# Patient Record
Sex: FEMALE | Race: WHITE | NOT HISPANIC OR LATINO | Employment: FULL TIME | URBAN - METROPOLITAN AREA
[De-identification: names, ages, dates, MRNs, and addresses within clinical notes are randomized per-mention and may not be internally consistent; named-entity substitution may affect disease eponyms.]

---

## 2023-03-03 PROBLEM — R94.31 ABNORMAL ECG: Status: ACTIVE | Noted: 2023-03-03

## 2023-03-03 PROBLEM — K13.0 LIP PAIN: Status: RESOLVED | Noted: 2023-03-03 | Resolved: 2023-03-03

## 2023-03-03 PROBLEM — R79.82 CRP ELEVATED: Status: ACTIVE | Noted: 2023-03-03

## 2023-03-03 PROBLEM — I20.1 CORONARY ARTERY SPASM (CMS-HCC): Status: ACTIVE | Noted: 2023-03-03

## 2023-03-03 PROBLEM — K21.9 GERD (GASTROESOPHAGEAL REFLUX DISEASE): Status: ACTIVE | Noted: 2023-03-03

## 2023-03-03 PROBLEM — M76.32 ILIOTIBIAL BAND SYNDROME OF LEFT SIDE: Status: ACTIVE | Noted: 2023-03-03

## 2023-03-03 PROBLEM — R92.30 DENSE BREASTS: Status: ACTIVE | Noted: 2023-03-03

## 2023-03-03 PROBLEM — N64.89 RADIAL SCAR OF BREAST: Status: ACTIVE | Noted: 2023-03-03

## 2023-03-03 PROBLEM — M79.10 MYALGIA: Status: ACTIVE | Noted: 2023-03-03

## 2023-03-03 PROBLEM — N95.9 MENOPAUSAL AND POSTMENOPAUSAL DISORDER: Status: ACTIVE | Noted: 2023-03-03

## 2023-03-03 PROBLEM — R92.8 ABNORMAL MAMMOGRAM: Status: ACTIVE | Noted: 2023-03-03

## 2023-03-03 PROBLEM — D49.0 RECTAL TUMOR: Status: ACTIVE | Noted: 2023-03-03

## 2023-03-03 PROBLEM — H10.13 ALLERGIC CONJUNCTIVITIS OF BOTH EYES: Status: ACTIVE | Noted: 2023-03-03

## 2023-03-03 PROBLEM — E78.5 HYPERLIPIDEMIA: Status: ACTIVE | Noted: 2023-03-03

## 2023-03-03 PROBLEM — S30.0XXA CONTUSION OF BUTTOCK: Status: RESOLVED | Noted: 2023-03-03 | Resolved: 2023-03-03

## 2023-03-03 PROBLEM — R92.2 DENSE BREASTS: Status: ACTIVE | Noted: 2023-03-03

## 2023-03-03 RX ORDER — MULTIVIT WITH IRON,MINERALS
TABLET,CHEWABLE ORAL
COMMUNITY

## 2023-03-03 RX ORDER — IBUPROFEN 100 MG/5ML
1 SUSPENSION, ORAL (FINAL DOSE FORM) ORAL DAILY
COMMUNITY

## 2023-03-03 RX ORDER — PRAVASTATIN SODIUM 80 MG/1
1 TABLET ORAL DAILY
COMMUNITY
Start: 2019-05-20 | End: 2023-04-28 | Stop reason: SDUPTHER

## 2023-03-03 RX ORDER — CALCIUM CARBONATE/VITAMIN D3 600 MG-10
TABLET ORAL
COMMUNITY

## 2023-03-03 RX ORDER — FAMOTIDINE 10 MG/1
TABLET ORAL AS NEEDED
COMMUNITY

## 2023-03-03 RX ORDER — CYANOCOBALAMIN (VITAMIN B-12) 250 MCG
250 TABLET ORAL DAILY
COMMUNITY

## 2023-03-03 RX ORDER — CYST/ALA/Q10/PHOS.SER/DHA/BROC 100-20-50
1 POWDER (GRAM) ORAL DAILY
COMMUNITY

## 2023-03-03 RX ORDER — METHOCARBAMOL 500 MG/1
500 TABLET, FILM COATED ORAL 2 TIMES DAILY
COMMUNITY
Start: 2020-04-17 | End: 2024-04-24 | Stop reason: WASHOUT

## 2023-03-03 RX ORDER — DIAPER,BRIEF,ADULT, DISPOSABLE
EACH MISCELLANEOUS
COMMUNITY

## 2023-03-03 RX ORDER — GINKGO BILOBA LEAF EXTRACT 60 MG
CAPSULE ORAL
COMMUNITY

## 2023-03-03 RX ORDER — MULTIVITAMIN
1 TABLET ORAL DAILY
COMMUNITY

## 2023-03-03 RX ORDER — PLANT STANOL ESTER 450 MG
8000 TABLET ORAL DAILY
COMMUNITY

## 2023-03-03 RX ORDER — PERPHENAZINE/AMITRIPTYLINE HCL 4MG-10MG
1 TABLET ORAL DAILY
COMMUNITY

## 2023-03-03 RX ORDER — PHENYLEPHRINE HCL 10 MG
500 TABLET ORAL DAILY
COMMUNITY

## 2023-03-03 RX ORDER — VITAMIN B COMPLEX
1 CAPSULE ORAL DAILY
COMMUNITY

## 2023-03-03 RX ORDER — PANTOPRAZOLE SODIUM 40 MG/1
1 TABLET, DELAYED RELEASE ORAL DAILY
COMMUNITY
Start: 2018-04-20 | End: 2023-04-28 | Stop reason: SDUPTHER

## 2023-03-03 RX ORDER — VITAMIN E (DL,TOCOPHERYL ACET) 180 MG
1 CAPSULE ORAL DAILY
COMMUNITY

## 2023-03-03 RX ORDER — GLUCOSAMINE/CHONDR SU A SOD 167-133 MG
1 CAPSULE ORAL DAILY
COMMUNITY

## 2023-03-03 RX ORDER — PROPRANOLOL/HYDROCHLOROTHIAZID 40 MG-25MG
1 TABLET ORAL DAILY
COMMUNITY

## 2023-03-03 RX ORDER — CALCIUM CARBONATE/VITAMIN D3 600MG-5MCG
1 TABLET ORAL DAILY
COMMUNITY

## 2023-03-03 RX ORDER — EPINEPHRINE 0.22MG
100 AEROSOL WITH ADAPTER (ML) INHALATION DAILY
COMMUNITY

## 2023-03-03 RX ORDER — ASPIRIN 81 MG/1
1 TABLET ORAL DAILY
COMMUNITY

## 2023-03-03 RX ORDER — GINSENG 100 MG
1 CAPSULE ORAL DAILY
COMMUNITY

## 2023-04-25 ASSESSMENT — PROMIS GLOBAL HEALTH SCALE
RATE_MENTAL_HEALTH: VERY GOOD
RATE_AVERAGE_PAIN: 3
EMOTIONAL_PROBLEMS: RARELY
RATE_QUALITY_OF_LIFE: VERY GOOD
RATE_PHYSICAL_HEALTH: VERY GOOD
CARRYOUT_SOCIAL_ACTIVITIES: VERY GOOD
RATE_AVERAGE_FATIGUE: MODERATE
RATE_GENERAL_HEALTH: VERY GOOD
RATE_SOCIAL_SATISFACTION: VERY GOOD
CARRYOUT_PHYSICAL_ACTIVITIES: COMPLETELY

## 2023-04-28 ENCOUNTER — LAB (OUTPATIENT)
Dept: LAB | Facility: LAB | Age: 63
End: 2023-04-28
Payer: COMMERCIAL

## 2023-04-28 ENCOUNTER — OFFICE VISIT (OUTPATIENT)
Dept: PRIMARY CARE | Facility: CLINIC | Age: 63
End: 2023-04-28
Payer: COMMERCIAL

## 2023-04-28 VITALS
HEART RATE: 71 BPM | TEMPERATURE: 97.5 F | RESPIRATION RATE: 16 BRPM | DIASTOLIC BLOOD PRESSURE: 74 MMHG | WEIGHT: 150 LBS | OXYGEN SATURATION: 98 % | SYSTOLIC BLOOD PRESSURE: 111 MMHG | BODY MASS INDEX: 25.75 KG/M2

## 2023-04-28 DIAGNOSIS — E53.8 VITAMIN B12 DEFICIENCY: ICD-10-CM

## 2023-04-28 DIAGNOSIS — Z00.00 ENCOUNTER FOR HEALTH MAINTENANCE EXAMINATION: Primary | ICD-10-CM

## 2023-04-28 DIAGNOSIS — E78.5 HYPERLIPIDEMIA, UNSPECIFIED HYPERLIPIDEMIA TYPE: ICD-10-CM

## 2023-04-28 DIAGNOSIS — E55.9 VITAMIN D DEFICIENCY: ICD-10-CM

## 2023-04-28 DIAGNOSIS — K21.9 GASTROESOPHAGEAL REFLUX DISEASE WITHOUT ESOPHAGITIS: ICD-10-CM

## 2023-04-28 DIAGNOSIS — Z00.00 ENCOUNTER FOR HEALTH MAINTENANCE EXAMINATION: ICD-10-CM

## 2023-04-28 LAB
ALANINE AMINOTRANSFERASE (SGPT) (U/L) IN SER/PLAS: 13 U/L (ref 7–45)
ALBUMIN (G/DL) IN SER/PLAS: 4.2 G/DL (ref 3.4–5)
ALKALINE PHOSPHATASE (U/L) IN SER/PLAS: 82 U/L (ref 33–136)
ANION GAP IN SER/PLAS: 12 MMOL/L (ref 10–20)
ASPARTATE AMINOTRANSFERASE (SGOT) (U/L) IN SER/PLAS: 15 U/L (ref 9–39)
BILIRUBIN TOTAL (MG/DL) IN SER/PLAS: 0.5 MG/DL (ref 0–1.2)
CALCIDIOL (25 OH VITAMIN D3) (NG/ML) IN SER/PLAS: 27 NG/ML
CALCIUM (MG/DL) IN SER/PLAS: 9.4 MG/DL (ref 8.6–10.3)
CARBON DIOXIDE, TOTAL (MMOL/L) IN SER/PLAS: 30 MMOL/L (ref 21–32)
CHLORIDE (MMOL/L) IN SER/PLAS: 103 MMOL/L (ref 98–107)
CHOLESTEROL (MG/DL) IN SER/PLAS: 173 MG/DL (ref 0–199)
CHOLESTEROL IN HDL (MG/DL) IN SER/PLAS: 44.7 MG/DL
CHOLESTEROL/HDL RATIO: 3.9
COBALAMIN (VITAMIN B12) (PG/ML) IN SER/PLAS: 547 PG/ML (ref 211–911)
CREATININE (MG/DL) IN SER/PLAS: 0.8 MG/DL (ref 0.5–1.05)
ERYTHROCYTE DISTRIBUTION WIDTH (RATIO) BY AUTOMATED COUNT: 12 % (ref 11.5–14.5)
ERYTHROCYTE MEAN CORPUSCULAR HEMOGLOBIN CONCENTRATION (G/DL) BY AUTOMATED: 32.5 G/DL (ref 32–36)
ERYTHROCYTE MEAN CORPUSCULAR VOLUME (FL) BY AUTOMATED COUNT: 92 FL (ref 80–100)
ERYTHROCYTES (10*6/UL) IN BLOOD BY AUTOMATED COUNT: 4.12 X10E12/L (ref 4–5.2)
GFR FEMALE: 83 ML/MIN/1.73M2
GLUCOSE (MG/DL) IN SER/PLAS: 87 MG/DL (ref 74–99)
HEMATOCRIT (%) IN BLOOD BY AUTOMATED COUNT: 37.9 % (ref 36–46)
HEMOGLOBIN (G/DL) IN BLOOD: 12.3 G/DL (ref 12–16)
LDL: 85 MG/DL (ref 0–99)
LEUKOCYTES (10*3/UL) IN BLOOD BY AUTOMATED COUNT: 7.9 X10E9/L (ref 4.4–11.3)
NON HDL CHOLESTEROL: 128 MG/DL
PLATELETS (10*3/UL) IN BLOOD AUTOMATED COUNT: 448 X10E9/L (ref 150–450)
POTASSIUM (MMOL/L) IN SER/PLAS: 4.6 MMOL/L (ref 3.5–5.3)
PROTEIN TOTAL: 6.9 G/DL (ref 6.4–8.2)
SODIUM (MMOL/L) IN SER/PLAS: 140 MMOL/L (ref 136–145)
TRIGLYCERIDE (MG/DL) IN SER/PLAS: 217 MG/DL (ref 0–149)
UREA NITROGEN (MG/DL) IN SER/PLAS: 11 MG/DL (ref 6–23)
VLDL: 43 MG/DL (ref 0–40)

## 2023-04-28 PROCEDURE — 36415 COLL VENOUS BLD VENIPUNCTURE: CPT

## 2023-04-28 PROCEDURE — 99396 PREV VISIT EST AGE 40-64: CPT | Performed by: FAMILY MEDICINE

## 2023-04-28 PROCEDURE — 82607 VITAMIN B-12: CPT

## 2023-04-28 PROCEDURE — 85027 COMPLETE CBC AUTOMATED: CPT

## 2023-04-28 PROCEDURE — 80053 COMPREHEN METABOLIC PANEL: CPT

## 2023-04-28 PROCEDURE — 80061 LIPID PANEL: CPT

## 2023-04-28 PROCEDURE — 82306 VITAMIN D 25 HYDROXY: CPT

## 2023-04-28 RX ORDER — PANTOPRAZOLE SODIUM 40 MG/1
40 TABLET, DELAYED RELEASE ORAL DAILY
Qty: 90 TABLET | Refills: 1 | Status: SHIPPED | OUTPATIENT
Start: 2023-04-28 | End: 2024-02-02 | Stop reason: SDUPTHER

## 2023-04-28 RX ORDER — PRAVASTATIN SODIUM 80 MG/1
80 TABLET ORAL DAILY
Qty: 90 TABLET | Refills: 1 | Status: SHIPPED | OUTPATIENT
Start: 2023-04-28 | End: 2024-02-02 | Stop reason: SDUPTHER

## 2023-04-28 NOTE — PROGRESS NOTES
Subjective   Patient ID: Trudy Wright is a 62 y.o. female who presents for Annual Exam.    HPI   Patient comes in today for complete physical exam.  She is currently complaining of some occasional right hip pain.  She is wondering if it may to be due to her cholesterol medication.  She is otherwise doing well and is taking her medicine without side effects.  She also takes multiple supplements.  She was recently in to see cardiology who felt everything was going okay and she has upcoming appointment with GYN.    4/26/2023  Dr. Enrique Chen, Cardiology  Trudy is a 62 year-old female with a history of hemangioma of the liver, osteochondritis, dyslipidemia, chest pain presumed GERD related, abnormal ECG (nonspecific ST-T abnormality diffusely), and mild catheter-induced coronary spasm noted by catheterization in 2008 being evaluated for routine follow-up.    Overall doing well. Continues to exercise without any issues. Occasion will have a fluttering in her chest. Occurs about every 2 weeks and lasts a minute or so. Does not occur with any specific activity. Subsides on its own. No associated lightheadedness, chest pain or shortness of breath.    She has had rashes with both amlodipine and Cardizem in the past.    Nuclear stress test 7/17/17 demonstrating no evidence of ischemia or scar.    Left heart catheterization 9/12/08 which demonstrated an angiographically normal right dominant system. There was mild catheter induced spasm relieved by intracoronary nitroglycerin.    She quit smoking in 2006.         4/1/2022  Patient comes in today for physical exam. She currently is without complaints. She brings with her a [facility] screening she had done last June which showed peripheral artery disease as well as an elevated CRP of 8.80. She is completely asymptomatic. She has an upcoming GYN appointment elsewhere.    10/15/2021  Patient presents today for 6-month checkup and refill of meds. She currently is without complaints.  She states that she is taking her medicines as directed and is not having any side effects from them.    4/9/2021  Patient presents today for annual checkup and refill of meds. She currently is without complaints. She states that she is taking her medicines as directed and is not having any side effects from them. She did have Covid back in November around Thanksgiving and still has not regained her sense of smell. She also did get her Covid vaccines.    8/9/21  Patient comes in today for a complete physical exam. She is currently without complaints. She has her GYN exam done at the [facility] each year.    5/19/2019   The patient is a 58 year old female who presents today for an annual exam. Menstrual periods are absent. She is not using any method of contraception at this time. She is sexually active. She does not perform monthly breast self exam. Her diet includes dairy products, meats, fast food, soda / pop and vitamins, but not balanced healthy meals, vegetables, water, coffee or energy drinks. She exercises lightly and 1 - 2 times per week. PHQ 2 DEPRESSION / GENERAL SCREENING: She does not report: often feeling down or depressed, having little interest in things, conflict or violence in the family, a mole that changed in size/shape, poor sleep, low energy or specific complaints. Chaperone declined during examination today.    Review of Systems   All other systems reviewed and are negative.      Objective   /74   Pulse 71   Temp 36.4 °C (97.5 °F)   Resp 16   Wt 68 kg (150 lb)   LMP  (LMP Unknown)   SpO2 98%   BMI 25.75 kg/m²     Physical Exam  Vitals reviewed.   Constitutional:       Appearance: She is well-developed.   HENT:      Head: Normocephalic and atraumatic.      Right Ear: Tympanic membrane, ear canal and external ear normal.      Left Ear: Tympanic membrane, ear canal and external ear normal.      Nose: Nose normal.      Mouth/Throat:      Mouth: Mucous membranes are moist.       Pharynx: Oropharynx is clear.   Eyes:      Extraocular Movements: Extraocular movements intact.      Conjunctiva/sclera: Conjunctivae normal.      Pupils: Pupils are equal, round, and reactive to light.   Cardiovascular:      Rate and Rhythm: Normal rate and regular rhythm.      Heart sounds: Normal heart sounds. No murmur heard.  Pulmonary:      Effort: Pulmonary effort is normal.      Breath sounds: Normal breath sounds. No wheezing.   Abdominal:      General: Abdomen is flat. Bowel sounds are normal.      Palpations: Abdomen is soft.   Musculoskeletal:         General: Normal range of motion.   Skin:     General: Skin is warm and dry.   Neurological:      General: No focal deficit present.      Mental Status: She is alert and oriented to person, place, and time. Mental status is at baseline.   Psychiatric:         Mood and Affect: Mood normal.         Behavior: Behavior normal.         Thought Content: Thought content normal.         Judgment: Judgment normal.         Assessment/Plan   Problem List Items Addressed This Visit       Hyperlipidemia    Relevant Medications    pravastatin (Pravachol) 80 mg tablet    Other Relevant Orders    Lipid Panel    GERD (gastroesophageal reflux disease)    Relevant Medications    pantoprazole (ProtoNix) 40 mg EC tablet     Other Visit Diagnoses       Encounter for health maintenance examination    -  Primary    Relevant Orders    Comprehensive Metabolic Panel    Vitamin B12 deficiency        Relevant Orders    CBC    Vitamin B12    Vitamin D deficiency        Relevant Orders    Vitamin D, Total        Will contact patient with lab results when available.  Continue current meds as directed.  Follow up in 6 months for recheck if all remains stable, sooner if problems arise.  Medication list reconciled.  Thank you for visiting today!      Professional services: Some of this note was completed using Dragon voice technology and sometimes the software misinterprets words. This may  include unintended errors with respect to translation of words, typographical errors or grammar errors which may not have been identified prior to finalization of the chart note. Please take this into account when reading the note. Thank you.

## 2023-05-02 ENCOUNTER — TELEPHONE (OUTPATIENT)
Dept: PRIMARY CARE | Facility: CLINIC | Age: 63
End: 2023-05-02
Payer: COMMERCIAL

## 2023-05-02 NOTE — TELEPHONE ENCOUNTER
----- Message from Joseph Schwartz DO sent at 5/2/2023  7:16 AM EDT -----  Regarding: Labs  Please notify patient of low vitamin D of 27.  It should be between 30 and 100 the closer to 50 the better. It is an important vitamin for your heart, lungs, immune system and bones among other things in your body. Would recommend OTC vitamin D3 2000 units daily to get it into and keep it in the normal range and recheck in August 2023.    All the rest of the labs look good.  Continue current medicines and recheck in 1 year.  ----- Message -----  From: Lab, Background User  Sent: 4/28/2023  12:40 PM EDT  To: Joseph Schwartz DO

## 2024-01-17 ENCOUNTER — TELEPHONE (OUTPATIENT)
Dept: OBSTETRICS AND GYNECOLOGY | Facility: CLINIC | Age: 64
End: 2024-01-17
Payer: COMMERCIAL

## 2024-01-17 DIAGNOSIS — Z00.00 HEALTHCARE MAINTENANCE: ICD-10-CM

## 2024-01-17 NOTE — TELEPHONE ENCOUNTER
Steve Bonilla :    This patient has a upcoming appointment in April, but would like a order for a mammogram and bone density if possible, she want to schedule it for a few weeks before the appointment in April.        Thank You

## 2024-02-02 ENCOUNTER — OFFICE VISIT (OUTPATIENT)
Dept: PRIMARY CARE | Facility: CLINIC | Age: 64
End: 2024-02-02
Payer: COMMERCIAL

## 2024-02-02 VITALS
OXYGEN SATURATION: 97 % | SYSTOLIC BLOOD PRESSURE: 110 MMHG | TEMPERATURE: 97.7 F | RESPIRATION RATE: 16 BRPM | WEIGHT: 144 LBS | DIASTOLIC BLOOD PRESSURE: 74 MMHG | HEART RATE: 74 BPM | BODY MASS INDEX: 24.72 KG/M2

## 2024-02-02 DIAGNOSIS — Z00.00 HEALTHCARE MAINTENANCE: Primary | ICD-10-CM

## 2024-02-02 DIAGNOSIS — K21.9 GASTROESOPHAGEAL REFLUX DISEASE WITHOUT ESOPHAGITIS: ICD-10-CM

## 2024-02-02 DIAGNOSIS — E78.5 HYPERLIPIDEMIA, UNSPECIFIED HYPERLIPIDEMIA TYPE: ICD-10-CM

## 2024-02-02 PROCEDURE — 99213 OFFICE O/P EST LOW 20 MIN: CPT | Performed by: FAMILY MEDICINE

## 2024-02-02 RX ORDER — PANTOPRAZOLE SODIUM 40 MG/1
40 TABLET, DELAYED RELEASE ORAL DAILY
Qty: 90 TABLET | Refills: 1 | Status: SHIPPED | OUTPATIENT
Start: 2024-02-02 | End: 2024-04-12 | Stop reason: SDUPTHER

## 2024-02-02 RX ORDER — PRAVASTATIN SODIUM 80 MG/1
80 TABLET ORAL DAILY
Qty: 90 TABLET | Refills: 1 | Status: SHIPPED | OUTPATIENT
Start: 2024-02-02 | End: 2024-04-12 | Stop reason: SDUPTHER

## 2024-02-02 NOTE — PROGRESS NOTES
Subjective   Patient ID: Trudy Wright is a 63 y.o. female who presents for Follow-up (6 mo med fu. ).    HPI   Patient presents today for 6-month checkup and refill of meds. She currently is without complaints. She states that she is taking her medicines as directed and is not having any side effects from them.    4/28/2023  Patient comes in today for complete physical exam.  She is currently complaining of some occasional right hip pain.  She is wondering if it may to be due to her cholesterol medication.  She is otherwise doing well and is taking her medicine without side effects.  She also takes multiple supplements.  She was recently in to see cardiology who felt everything was going okay and she has upcoming appointment with GYN.    Review of Systems   All other systems reviewed and are negative.      Objective   /74   Pulse 74   Temp 36.5 °C (97.7 °F)   Resp 16   Wt 65.3 kg (144 lb)   LMP  (LMP Unknown)   SpO2 97%   BMI 24.72 kg/m²     Physical Exam  Vitals reviewed.   Constitutional:       Appearance: She is well-developed.   HENT:      Head: Normocephalic and atraumatic.      Right Ear: Tympanic membrane, ear canal and external ear normal.      Left Ear: Tympanic membrane, ear canal and external ear normal.      Nose: Nose normal.      Mouth/Throat:      Mouth: Mucous membranes are moist.      Pharynx: Oropharynx is clear.   Eyes:      Extraocular Movements: Extraocular movements intact.      Conjunctiva/sclera: Conjunctivae normal.      Pupils: Pupils are equal, round, and reactive to light.   Cardiovascular:      Rate and Rhythm: Normal rate and regular rhythm.      Heart sounds: Normal heart sounds. No murmur heard.  Pulmonary:      Effort: Pulmonary effort is normal.      Breath sounds: Normal breath sounds. No wheezing.   Abdominal:      General: Abdomen is flat. Bowel sounds are normal.      Palpations: Abdomen is soft.   Musculoskeletal:         General: Normal range of motion.   Skin:      General: Skin is warm and dry.   Neurological:      General: No focal deficit present.      Mental Status: She is alert and oriented to person, place, and time. Mental status is at baseline.   Psychiatric:         Mood and Affect: Mood normal.         Behavior: Behavior normal.         Thought Content: Thought content normal.         Judgment: Judgment normal.       Assessment/Plan   Problem List Items Addressed This Visit             ICD-10-CM    Hyperlipidemia E78.5    Relevant Medications    pravastatin (Pravachol) 80 mg tablet    GERD (gastroesophageal reflux disease) K21.9    Relevant Medications    pantoprazole (ProtoNix) 40 mg EC tablet     Other Visit Diagnoses         Codes    Healthcare maintenance    -  Primary Z00.00    Relevant Orders    CT cardiac scoring wo IV contrast        Return to clinic in the near future for complete physical exam and fasting labs.  Medication list reconciled.  Thank you for visiting today!      Professional services: Some of this note was completed using Dragon voice technology and sometimes the software misinterprets words. This may include unintended errors with respect to translation of words, typographical errors or grammar errors which may not have been identified prior to finalization of the chart note. Please take this into account when reading the note. Thank you.

## 2024-03-07 ENCOUNTER — HOSPITAL ENCOUNTER (OUTPATIENT)
Dept: RADIOLOGY | Facility: CLINIC | Age: 64
Discharge: HOME | End: 2024-03-07
Payer: COMMERCIAL

## 2024-03-07 DIAGNOSIS — Z00.00 HEALTHCARE MAINTENANCE: ICD-10-CM

## 2024-03-07 PROCEDURE — 75571 CT HRT W/O DYE W/CA TEST: CPT

## 2024-03-11 NOTE — RESULT ENCOUNTER NOTE
Steve Yates,    Your coronary artery calcium score was excellent at 34.87.  When a calcium score is between 1 and 99 this means that some very mild hardening of the arteries is present and the risk of a heart attack over the next 10 years is 4%. Would recommend continuing to watch cholesterol in the diet and try to exercise 3-5 times a week.    Have a Great Day!!!    Dr. Schwartz

## 2024-04-12 ENCOUNTER — OFFICE VISIT (OUTPATIENT)
Dept: PRIMARY CARE | Facility: CLINIC | Age: 64
End: 2024-04-12
Payer: COMMERCIAL

## 2024-04-12 ENCOUNTER — HOSPITAL ENCOUNTER (OUTPATIENT)
Dept: RADIOLOGY | Facility: CLINIC | Age: 64
Discharge: HOME | End: 2024-04-12
Payer: COMMERCIAL

## 2024-04-12 ENCOUNTER — LAB (OUTPATIENT)
Dept: LAB | Facility: LAB | Age: 64
End: 2024-04-12
Payer: COMMERCIAL

## 2024-04-12 VITALS
SYSTOLIC BLOOD PRESSURE: 108 MMHG | RESPIRATION RATE: 16 BRPM | OXYGEN SATURATION: 96 % | HEIGHT: 64 IN | WEIGHT: 146 LBS | HEART RATE: 65 BPM | DIASTOLIC BLOOD PRESSURE: 71 MMHG | BODY MASS INDEX: 24.92 KG/M2 | TEMPERATURE: 97.8 F

## 2024-04-12 VITALS — HEIGHT: 64 IN | BODY MASS INDEX: 24.75 KG/M2 | WEIGHT: 145 LBS

## 2024-04-12 DIAGNOSIS — E55.9 VITAMIN D DEFICIENCY: ICD-10-CM

## 2024-04-12 DIAGNOSIS — E53.8 VITAMIN B12 DEFICIENCY: ICD-10-CM

## 2024-04-12 DIAGNOSIS — Z00.00 HEALTHCARE MAINTENANCE: ICD-10-CM

## 2024-04-12 DIAGNOSIS — Z00.00 ENCOUNTER FOR HEALTH MAINTENANCE EXAMINATION: ICD-10-CM

## 2024-04-12 DIAGNOSIS — Z00.00 ENCOUNTER FOR HEALTH MAINTENANCE EXAMINATION: Primary | ICD-10-CM

## 2024-04-12 DIAGNOSIS — K21.9 GASTROESOPHAGEAL REFLUX DISEASE WITHOUT ESOPHAGITIS: ICD-10-CM

## 2024-04-12 DIAGNOSIS — E78.5 HYPERLIPIDEMIA, UNSPECIFIED HYPERLIPIDEMIA TYPE: ICD-10-CM

## 2024-04-12 LAB
25(OH)D3 SERPL-MCNC: 32 NG/ML (ref 30–100)
ALBUMIN SERPL BCP-MCNC: 4.1 G/DL (ref 3.4–5)
ALP SERPL-CCNC: 74 U/L (ref 33–136)
ALT SERPL W P-5'-P-CCNC: 14 U/L (ref 7–45)
ANION GAP SERPL CALC-SCNC: 11 MMOL/L (ref 10–20)
AST SERPL W P-5'-P-CCNC: 14 U/L (ref 9–39)
BILIRUB SERPL-MCNC: 0.5 MG/DL (ref 0–1.2)
BUN SERPL-MCNC: 12 MG/DL (ref 6–23)
CALCIUM SERPL-MCNC: 9.4 MG/DL (ref 8.6–10.3)
CHLORIDE SERPL-SCNC: 103 MMOL/L (ref 98–107)
CHOLEST SERPL-MCNC: 173 MG/DL (ref 0–199)
CHOLESTEROL/HDL RATIO: 3.7
CO2 SERPL-SCNC: 29 MMOL/L (ref 21–32)
CREAT SERPL-MCNC: 0.85 MG/DL (ref 0.5–1.05)
EGFRCR SERPLBLD CKD-EPI 2021: 77 ML/MIN/1.73M*2
ERYTHROCYTE [DISTWIDTH] IN BLOOD BY AUTOMATED COUNT: 12.2 % (ref 11.5–14.5)
GLUCOSE SERPL-MCNC: 86 MG/DL (ref 74–99)
HCT VFR BLD AUTO: 36.9 % (ref 36–46)
HDLC SERPL-MCNC: 46.2 MG/DL
HGB BLD-MCNC: 12 G/DL (ref 12–16)
LDLC SERPL CALC-MCNC: 88 MG/DL
MCH RBC QN AUTO: 29.3 PG (ref 26–34)
MCHC RBC AUTO-ENTMCNC: 32.5 G/DL (ref 32–36)
MCV RBC AUTO: 90 FL (ref 80–100)
NON HDL CHOLESTEROL: 127 MG/DL (ref 0–149)
NRBC BLD-RTO: 0 /100 WBCS (ref 0–0)
PLATELET # BLD AUTO: 446 X10*3/UL (ref 150–450)
POTASSIUM SERPL-SCNC: 4.5 MMOL/L (ref 3.5–5.3)
PROT SERPL-MCNC: 7.1 G/DL (ref 6.4–8.2)
RBC # BLD AUTO: 4.1 X10*6/UL (ref 4–5.2)
SODIUM SERPL-SCNC: 138 MMOL/L (ref 136–145)
TRIGL SERPL-MCNC: 193 MG/DL (ref 0–149)
VIT B12 SERPL-MCNC: 469 PG/ML (ref 211–911)
VLDL: 39 MG/DL (ref 0–40)
WBC # BLD AUTO: 7.3 X10*3/UL (ref 4.4–11.3)

## 2024-04-12 PROCEDURE — 80061 LIPID PANEL: CPT

## 2024-04-12 PROCEDURE — 99396 PREV VISIT EST AGE 40-64: CPT | Performed by: FAMILY MEDICINE

## 2024-04-12 PROCEDURE — 85027 COMPLETE CBC AUTOMATED: CPT

## 2024-04-12 PROCEDURE — 77080 DXA BONE DENSITY AXIAL: CPT

## 2024-04-12 PROCEDURE — 36415 COLL VENOUS BLD VENIPUNCTURE: CPT

## 2024-04-12 PROCEDURE — 82607 VITAMIN B-12: CPT

## 2024-04-12 PROCEDURE — 82306 VITAMIN D 25 HYDROXY: CPT

## 2024-04-12 PROCEDURE — 80053 COMPREHEN METABOLIC PANEL: CPT

## 2024-04-12 PROCEDURE — 77063 BREAST TOMOSYNTHESIS BI: CPT | Performed by: RADIOLOGY

## 2024-04-12 PROCEDURE — 77080 DXA BONE DENSITY AXIAL: CPT | Performed by: STUDENT IN AN ORGANIZED HEALTH CARE EDUCATION/TRAINING PROGRAM

## 2024-04-12 PROCEDURE — 77067 SCR MAMMO BI INCL CAD: CPT

## 2024-04-12 PROCEDURE — 77067 SCR MAMMO BI INCL CAD: CPT | Performed by: RADIOLOGY

## 2024-04-12 RX ORDER — PRAVASTATIN SODIUM 80 MG/1
80 TABLET ORAL DAILY
Qty: 90 TABLET | Refills: 3 | Status: SHIPPED | OUTPATIENT
Start: 2024-04-12 | End: 2025-04-07

## 2024-04-12 RX ORDER — PANTOPRAZOLE SODIUM 40 MG/1
40 TABLET, DELAYED RELEASE ORAL DAILY
Qty: 90 TABLET | Refills: 3 | Status: SHIPPED | OUTPATIENT
Start: 2024-04-12 | End: 2025-04-07

## 2024-04-12 NOTE — PROGRESS NOTES
"Subjective   Patient ID: Trudy Wright is a 63 y.o. female who presents for Annual Exam (No questions, concerns, or complaints. /).  HPI  Patient comes in today for annual physical exam.  She is currently without complaints.  She is taking all her medicines as directed and is not having any side effects from them.  She is in need of refills.  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  She has upcoming routine appointments later this month with GYN, GI and cardiology.    2/2/2024  Patient presents today for 6-month checkup and refill of meds. She currently is without complaints. She states that she is taking her medicines as directed and is not having any side effects from them.    4/28/2023  Patient comes in today for complete physical exam.  She is currently complaining of some occasional right hip pain.  She is wondering if it may to be due to her cholesterol medication.  She is otherwise doing well and is taking her medicine without side effects.  She also takes multiple supplements.  She was recently in to see cardiology who felt everything was going okay and she has upcoming appointment with GYN.    Review of Systems   All other systems reviewed and are negative.      Objective   Vitals:  /71   Pulse 65   Temp 36.6 °C (97.8 °F)   Resp 16   Ht 1.626 m (5' 4\")   Wt 66.2 kg (146 lb)   LMP  (LMP Unknown)   SpO2 96%   BMI 25.06 kg/m²     Physical Exam  Vitals reviewed.   Constitutional:       Appearance: She is well-developed.   HENT:      Head: Normocephalic and atraumatic.      Right Ear: Tympanic membrane, ear canal and external ear normal.      Left Ear: Tympanic membrane, ear canal and external ear normal.      Nose: Nose normal.      Mouth/Throat:      Mouth: Mucous membranes are moist.      Pharynx: Oropharynx is clear.   Eyes:      Extraocular Movements: Extraocular movements intact.      Conjunctiva/sclera: Conjunctivae normal.      Pupils: Pupils are equal, round, and reactive to " light.   Cardiovascular:      Rate and Rhythm: Normal rate and regular rhythm.      Heart sounds: Normal heart sounds. No murmur heard.  Pulmonary:      Effort: Pulmonary effort is normal.      Breath sounds: Normal breath sounds. No wheezing.   Abdominal:      General: Abdomen is flat. Bowel sounds are normal.      Palpations: Abdomen is soft.   Musculoskeletal:         General: Normal range of motion.   Skin:     General: Skin is warm and dry.   Neurological:      General: No focal deficit present.      Mental Status: She is alert and oriented to person, place, and time. Mental status is at baseline.   Psychiatric:         Mood and Affect: Mood normal.         Behavior: Behavior normal.         Thought Content: Thought content normal.         Judgment: Judgment normal.       Assessment/Plan   Problem List Items Addressed This Visit       Hyperlipidemia    Relevant Medications    pravastatin (Pravachol) 80 mg tablet    Other Relevant Orders    Lipid Panel (Completed)    GERD (gastroesophageal reflux disease)    Relevant Medications    pantoprazole (ProtoNix) 40 mg EC tablet     Other Visit Diagnoses       Vitamin B12 deficiency    -  Primary    Relevant Orders    CBC (Completed)    Vitamin B12 (Completed)    Vitamin D deficiency        Relevant Orders    Vitamin D 25-Hydroxy,Total (for eval of Vitamin D levels) (Completed)    Encounter for health maintenance examination        Relevant Orders    Comprehensive Metabolic Panel (Completed)        Will contact patient with lab results when available.  Continue current meds as directed.  Return to clinic in one year for recheck, sooner if problems should arise.  Medication list reconciled.  Thank you for visiting today!      Professional services: Some of this note was completed using Dragon voice technology and sometimes the software misinterprets words. This may include unintended errors with respect to translation of words, typographical errors or grammar errors  which may not have been identified prior to finalization of the chart note. Please take this into account when reading the note. Thank you.               Joseph Schwartz DO 04/13/24 8:46 AM 8:46 AM

## 2024-04-12 NOTE — RESULT ENCOUNTER NOTE
Steve Yates,    Your vitamin D level was low normal at 32.  It should be between 30 and 100 the closer to 50 the better. It is an important vitamin for your heart, lungs, immune system and bones among other things in your body. Would recommend over the counter vitamin D3 2000 units daily to get it into and keep it in the normal range and recheck in August 2024.    All the rest of your labs were excellent!  Continue current medicines and recheck in 1 year.    Have a Great Day and Weekend!!!    Dr. Schwartz

## 2024-04-12 NOTE — RESULT ENCOUNTER NOTE
DEXA shows osteopenia.  Recommend calcium vitamin D weightbearing exercise.  Repeat bone density 2 years

## 2024-04-16 ENCOUNTER — TELEPHONE (OUTPATIENT)
Dept: OBSTETRICS AND GYNECOLOGY | Facility: CLINIC | Age: 64
End: 2024-04-16
Payer: COMMERCIAL

## 2024-04-16 NOTE — TELEPHONE ENCOUNTER
RN called patient to discuss results.    RN verified patient by name and     RN discussed results with patient, and doctor recommendations     DEXA shows osteopenia.  Recommend calcium vitamin D weightbearing exercise.  Repeat bone density 2 years     Patient verbalized understanding    RUPA Henderson            Result Communication    Resulted Orders   XR DEXA bone density    Narrative    Interpreted By:  Trey Jacobo,   STUDY:  DEXA BONE DENSITY2024 10:46 am      INDICATION:  Signs/Symptoms:indicated per dr daly at last office visit. The  patient is a 64 y/o  year old F.      COMPARISON:  None.      ACCESSION NUMBER(S):  [id number]      ORDERING CLINICIAN:  HERMAN DALY      TECHNIQUE:  DEXA BONE DENSITY      FINDINGS:  SPINE L1-L4  Bone Mineral Density: 0.997  T-Score -1.5  Z-Score -0.1  Bone Mineral Density change vs baseline:  Not reported  Bone Mineral Density change vs previous: Not reported      LEFT FEMUR -TOTAL  Bone Mineral Density: 0.842  T-Score -1.3   Z-Score  -0.2  Bone Mineral Density change vs baseline: Not reported  Bone Mineral Density change vs previous: Not reported      LEFT FEMUR -NECK  Bone Mineral Density: 0.845  T-Score -1.4  Z-Score 0.0  Bone Mineral Density change vs baseline: Not reported  Bone Mineral Density change vs previous: Not reported          World Health Organization (WHO) criteria for post-menopausal,   Women:  Normal:         T-score at or above -1 SD  Osteopenia:   T-score between -1 and -2.5 SD  Osteoporosis: T-score at or below -2.5 SD          10-year Fracture Risk:  Major Osteoporotic Fracture  8.5  Hip Fracture                        0.8      Note:  If no FRAX score is reported, it is because:  Some T-score for Spine Total or Hip Total or Femoral Neck at or below  -2.5      This exam was performed at [facility] on a GE Lunar  Prodigy Dexa        Impression    DEXA:  According to World Health Organization criteria,  classification  is low bone mass (osteopenia)      Followup recommended in two years or sooner as clinically warranted.      All images and detailed analysis are available on the  Radiology  PACS.      MACRO:  None      Signed by: Trey Jacobo 4/12/2024 12:45 PM  Dictation workstation:   [id number]       1:56 PM      Results were successfully communicated with the patient and they acknowledged their understanding.

## 2024-04-16 NOTE — TELEPHONE ENCOUNTER
----- Message from Irene Grant RN sent at 4/16/2024  1:36 PM EDT -----  Are you able to call with results?  ----- Message -----  From: Korey Fernandez MD  Sent: 4/12/2024  12:58 PM EDT  To: Irene Grant RN    DEXA shows osteopenia.  Recommend calcium vitamin D weightbearing exercise.  Repeat bone density 2 years

## 2024-04-19 ENCOUNTER — APPOINTMENT (OUTPATIENT)
Dept: OBSTETRICS AND GYNECOLOGY | Facility: CLINIC | Age: 64
End: 2024-04-19
Payer: COMMERCIAL

## 2024-04-24 ENCOUNTER — OFFICE VISIT (OUTPATIENT)
Dept: CARDIOLOGY | Facility: CLINIC | Age: 64
End: 2024-04-24
Payer: COMMERCIAL

## 2024-04-24 VITALS
HEART RATE: 87 BPM | HEIGHT: 64 IN | RESPIRATION RATE: 18 BRPM | SYSTOLIC BLOOD PRESSURE: 108 MMHG | OXYGEN SATURATION: 100 % | BODY MASS INDEX: 25.27 KG/M2 | WEIGHT: 148 LBS | DIASTOLIC BLOOD PRESSURE: 62 MMHG

## 2024-04-24 DIAGNOSIS — E78.5 HYPERLIPIDEMIA, UNSPECIFIED HYPERLIPIDEMIA TYPE: Primary | ICD-10-CM

## 2024-04-24 DIAGNOSIS — R94.31 ABNORMAL ECG: ICD-10-CM

## 2024-04-24 PROCEDURE — 1036F TOBACCO NON-USER: CPT | Performed by: NURSE PRACTITIONER

## 2024-04-24 PROCEDURE — 99214 OFFICE O/P EST MOD 30 MIN: CPT | Performed by: NURSE PRACTITIONER

## 2024-04-24 PROCEDURE — 93000 ELECTROCARDIOGRAM COMPLETE: CPT | Performed by: NURSE PRACTITIONER

## 2024-04-24 NOTE — PROGRESS NOTES
Name : Trudy Wright   : 1960   MRN : [id number]   ENC Date : 2024    Primary Cardiologist: Dr. Chen     CC: Annual Exam     HPI:    Trudy Wright is a 63 y.o. female with PMHx sig for hemangioma of the liver, osteochondritis, dyslipidemia, chest pain presumed GERD related, abnormal ECG (nonspecific ST & T abnormality diffusely), and mild catheter-induced coronary spasm noted by catheterization in  who presents today for annual cardiovascular follow up.     Denies any chest pain, pressure, SOB/YOO, PND, orthopnea, LE edema, palpitations, lightheadedness, dizziness, or syncope.     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 No stroke like symptoms, no syncope.     She has had rashes with both amlodipine and Cardizem in the past.      She quit smoking in .     CV Diagnostics:  Nuclear stress test 17 demonstrating no evidence of ischemia or scar.     Left heart catheterization 08 which demonstrated an angiographically normal right dominant system. There was mild catheter induced spasm relieved by intracoronary nitroglycerin.    ROS: unless otherwise noted in the history of present illness, all other systems were reviewed and they are negative for complaints     Allergies:  Amlodipine and Diltiazem    Current Outpatient Medications   Medication Instructions    ascorbic acid (Vitamin C) 1,000 mg tablet 1 tablet, oral, Daily    aspirin 81 mg EC tablet 1 tablet, oral, Daily    b complex vitamins capsule 1 capsule, oral, Daily    calcium carbonate-vitamin D3 600 mg-5 mcg (200 unit) tablet 1 tablet, oral, Daily    cinnamon 500 mg, oral, Daily    coconut oiL 1,000 mg capsule 1 capsule, oral, Daily    coenzyme Q-10 100 mg, oral, Daily    cranberry extract 200 mg capsule 1 capsule, oral, Daily    cyanocobalamin (VITAMIN B-12) 250 mcg, oral, Daily    famotidine (Pepcid) 10 mg tablet oral, As needed    glucosamine-chondroitin 250-200 mg tablet oral     "green tea leaf extract (Green Tea) capsule oral    lecithin, soy 1,200 mg capsule oral    lysine  mg capsule 1 capsule, oral, Daily    multivitamin tablet 1 tablet, oral, Daily    niacin 500 mg tablet 1 tablet, oral, Daily    omega-3 fatty acids-fish oil (One-Per-Day Omega-3) 684-1,200 mg capsule oral    pantoprazole (PROTONIX) 40 mg, oral, Daily    pravastatin (PRAVACHOL) 80 mg, oral, Daily    turmeric-turmeric root extract 450-50 mg capsule 1 capsule, oral, Daily    vit A,C and E-lutein-minerals (Vision Formula, with lutein,) 300 mcg-200 mg-27 mg-2 mg tablet 1 tablet, oral, Daily    vitamin A 8,000 Units, oral, Daily        Last Labs:  CBC  Lab Results   Component Value Date    WBC 7.3 04/12/2024    HGB 12.0 04/12/2024    HCT 36.9 04/12/2024    MCV 90 04/12/2024     04/12/2024       CMP  Lab Results   Component Value Date    CALCIUM 9.4 04/12/2024    PROT 7.1 04/12/2024    ALBUMIN 4.1 04/12/2024    AST 14 04/12/2024    ALT 14 04/12/2024    ALKPHOS 74 04/12/2024    BILITOT 0.5 04/12/2024       BMP   Lab Results   Component Value Date     04/12/2024    K 4.5 04/12/2024     04/12/2024    CO2 29 04/12/2024    GLUCOSE 86 04/12/2024    BUN 12 04/12/2024    CREATININE 0.85 04/12/2024       LIPID PANEL   Lab Results   Component Value Date    CHOL 173 04/12/2024    TRIG 193 (H) 04/12/2024    HDL 46.2 04/12/2024    CHHDL 3.7 04/12/2024    LDLF 85 04/28/2023    VLDL 39 04/12/2024    NHDL 127 04/12/2024       RENAL FUNCTION PANEL   Lab Results   Component Value Date    GLUCOSE 86 04/12/2024     04/12/2024    K 4.5 04/12/2024     04/12/2024    CO2 29 04/12/2024    ANIONGAP 11 04/12/2024    BUN 12 04/12/2024    CREATININE 0.85 04/12/2024    CALCIUM 9.4 04/12/2024    ALBUMIN 4.1 04/12/2024        No results found for: \"BNP\", \"HGBA1C\"  I have reviewed the above labs & diagnostics    Last Recorded Vitals:  Vitals:    04/24/24 1308   BP: 108/62   BP Location: Left arm   Patient Position: " "Sitting   Pulse: 87   Resp: 18   SpO2: 100%   Weight: 67.1 kg (148 lb)   Height: 1.626 m (5' 4\")     Physical Exam:  On exam Ms. Wright appears her stated age, is alert and oriented x3, and in no acute distress. Her sclera are anicteric and her oropharynx has moist mucous membranes. Her neck is supple and without thyromegaly. No carotid bruit. The JVP is ~5 cm of water above the right atrium. Her cardiac exam has regular rhythm, normal S1, S2. No S3/4. There are no murmurs. Her lungs are clear to auscultation bilaterally and there is no dullness to percussion. Her abdomen is soft, nontender with normoactive bowel sounds. There is no HJR. The extremities are warm and without edema. The skin is dry. There is no rash present. The distal pulses are 2-3+ in all four extremities. Her mood and affect are appropriate for todays encounter.     Assessment/Plan:  1) catheter induced coronary spasm: No symptoms suggestive of progressive disease. Continue to observe.     2) dyslipidemia: Continue pravastatin.      3) abnormal ECG: Unchanged from previous years. No changes needed.     Follow up in 1 year or as needed    Tracy M Schwab, APRN-CNP    "

## 2024-05-10 ENCOUNTER — OFFICE VISIT (OUTPATIENT)
Dept: OBSTETRICS AND GYNECOLOGY | Facility: CLINIC | Age: 64
End: 2024-05-10
Payer: COMMERCIAL

## 2024-05-10 VITALS
BODY MASS INDEX: 24.96 KG/M2 | WEIGHT: 145.4 LBS | SYSTOLIC BLOOD PRESSURE: 116 MMHG | HEART RATE: 73 BPM | DIASTOLIC BLOOD PRESSURE: 75 MMHG

## 2024-05-10 DIAGNOSIS — Z01.419 ENCOUNTER FOR ANNUAL ROUTINE GYNECOLOGICAL EXAMINATION: Primary | ICD-10-CM

## 2024-05-10 DIAGNOSIS — N36.9 URETHRAL LESION: ICD-10-CM

## 2024-05-10 PROCEDURE — 1036F TOBACCO NON-USER: CPT | Performed by: STUDENT IN AN ORGANIZED HEALTH CARE EDUCATION/TRAINING PROGRAM

## 2024-05-10 PROCEDURE — 87624 HPV HI-RISK TYP POOLED RSLT: CPT

## 2024-05-10 PROCEDURE — 99396 PREV VISIT EST AGE 40-64: CPT | Performed by: STUDENT IN AN ORGANIZED HEALTH CARE EDUCATION/TRAINING PROGRAM

## 2024-05-10 PROCEDURE — 88142 CYTOPATH C/V THIN LAYER: CPT

## 2024-05-10 ASSESSMENT — PAIN SCALES - GENERAL: PAINLEVEL: 0-NO PAIN

## 2024-05-10 NOTE — PROGRESS NOTES
Subjective   Patient ID: Trudy Wright is a 63 y.o. female who presents for No chief complaint on file..  Patient here for annual visit.  Patient with no breast complaints.  No bowel complaints.  Does report some urinary urgency is not bothersome.  Denies any vaginal bleeding for greater than a decade.        Review of Systems   All other systems reviewed and are negative.      Objective   Physical Exam  Vitals reviewed. Exam conducted with a chaperone present.   Constitutional:       General: She is not in acute distress.     Appearance: Normal appearance. She is not ill-appearing.   Pulmonary:      Effort: Pulmonary effort is normal.   Chest:   Breasts:     Breasts are symmetrical.      Right: Normal. No swelling, bleeding, inverted nipple, mass, nipple discharge, skin change or tenderness.      Left: Normal. No swelling, bleeding, inverted nipple, mass, nipple discharge, skin change or tenderness.   Abdominal:      General: There is no distension.      Palpations: Abdomen is soft. There is no mass.      Tenderness: There is no abdominal tenderness. There is no guarding or rebound.      Hernia: There is no hernia in the left inguinal area or right inguinal area.   Genitourinary:     General: Normal vulva.      Exam position: Lithotomy position.      Labia:         Right: No rash, tenderness, lesion or injury.         Left: No rash, tenderness, lesion or injury.       Urethra: Urethral lesion present. No prolapse or urethral swelling.      Vagina: No vaginal discharge, erythema, tenderness, bleeding, lesions or prolapsed vaginal walls.      Cervix: No cervical motion tenderness, discharge, friability, lesion, erythema or cervical bleeding.      Uterus: Not deviated, not enlarged, not fixed, not tender and no uterine prolapse.       Adnexa:         Right: No mass, tenderness or fullness.          Left: No mass, tenderness or fullness.        Comments: Red fleshy lesion at urethral meatus  Musculoskeletal:      Right  lower leg: No edema.      Left lower leg: No edema.   Lymphadenopathy:      Upper Body:      Right upper body: No supraclavicular, axillary or pectoral adenopathy.      Left upper body: No supraclavicular, axillary or pectoral adenopathy.      Lower Body: No right inguinal adenopathy. No left inguinal adenopathy.   Neurological:      Mental Status: She is alert.         Assessment/Plan   Diagnoses and all orders for this visit:  Encounter for annual routine gynecological examination  -     THINPREP PAP TEST  Urethral lesion  -     Referral to Urogynecology; Future    Patient here for annual visit.  Pap obtained.  Clinical breast and pelvic exams overall within normal limits.  Small urethral lesions noted we will refer patient to urogynecology for further evaluation.       Jace Lang MD 05/10/24 4:21 PM

## 2024-05-23 LAB
CYTOLOGY CMNT CVX/VAG CYTO-IMP: NORMAL
HPV HR 12 DNA GENITAL QL NAA+PROBE: NEGATIVE
HPV HR GENOTYPES PNL CVX NAA+PROBE: NEGATIVE
HPV16 DNA SPEC QL NAA+PROBE: NEGATIVE
HPV18 DNA SPEC QL NAA+PROBE: NEGATIVE
LAB AP HPV GENOTYPE QUESTION: YES
LAB AP HPV HR: NORMAL
LABORATORY COMMENT REPORT: NORMAL
LABORATORY COMMENT REPORT: NORMAL
PATH REPORT.TOTAL CANCER: NORMAL

## 2024-07-19 ENCOUNTER — OFFICE VISIT (OUTPATIENT)
Dept: OBSTETRICS AND GYNECOLOGY | Facility: CLINIC | Age: 64
End: 2024-07-19
Payer: COMMERCIAL

## 2024-07-19 VITALS
SYSTOLIC BLOOD PRESSURE: 100 MMHG | WEIGHT: 143 LBS | HEART RATE: 86 BPM | BODY MASS INDEX: 24.41 KG/M2 | DIASTOLIC BLOOD PRESSURE: 66 MMHG | HEIGHT: 64 IN

## 2024-07-19 DIAGNOSIS — N36.2 URETHRAL CARUNCLE: Primary | ICD-10-CM

## 2024-07-19 DIAGNOSIS — N39.3 SUI (STRESS URINARY INCONTINENCE, FEMALE): ICD-10-CM

## 2024-07-19 DIAGNOSIS — N94.19 DYSPAREUNIA DUE TO MEDICAL CONDITION IN FEMALE PATIENT: ICD-10-CM

## 2024-07-19 DIAGNOSIS — N36.9 URETHRAL LESION: ICD-10-CM

## 2024-07-19 DIAGNOSIS — N95.2 VAGINAL ATROPHY: ICD-10-CM

## 2024-07-19 LAB
POC APPEARANCE, URINE: CLEAR
POC BILIRUBIN, URINE: NEGATIVE
POC BLOOD, URINE: NEGATIVE
POC COLOR, URINE: YELLOW
POC GLUCOSE, URINE: NEGATIVE MG/DL
POC KETONES, URINE: NEGATIVE MG/DL
POC LEUKOCYTES, URINE: NEGATIVE
POC NITRITE,URINE: NEGATIVE
POC PH, URINE: 5.5 PH
POC PROTEIN, URINE: NEGATIVE MG/DL
POC SPECIFIC GRAVITY, URINE: >=1.03
POC UROBILINOGEN, URINE: 0.2 EU/DL

## 2024-07-19 PROCEDURE — 99214 OFFICE O/P EST MOD 30 MIN: CPT | Performed by: OBSTETRICS & GYNECOLOGY

## 2024-07-19 PROCEDURE — 3008F BODY MASS INDEX DOCD: CPT | Performed by: OBSTETRICS & GYNECOLOGY

## 2024-07-19 PROCEDURE — 81003 URINALYSIS AUTO W/O SCOPE: CPT | Mod: QW | Performed by: OBSTETRICS & GYNECOLOGY

## 2024-07-19 RX ORDER — ESTRADIOL 0.1 MG/G
0.5 CREAM VAGINAL DAILY
Qty: 42.5 G | Refills: 3 | Status: SHIPPED | OUTPATIENT
Start: 2024-07-19 | End: 2025-07-19

## 2024-07-19 ASSESSMENT — ENCOUNTER SYMPTOMS
CONSTITUTIONAL NEGATIVE: 1
PSYCHIATRIC NEGATIVE: 1
NEUROLOGICAL NEGATIVE: 1
RESPIRATORY NEGATIVE: 1
CONSTIPATION: 1
ENDOCRINE NEGATIVE: 1
CARDIOVASCULAR NEGATIVE: 1
MUSCULOSKELETAL NEGATIVE: 1
EYES NEGATIVE: 1

## 2024-07-19 ASSESSMENT — PAIN SCALES - GENERAL: PAINLEVEL: 0-NO PAIN

## 2024-07-19 NOTE — PROGRESS NOTES
[facility] Department of Urogynecology   Beth Bo MD, MPH   [phone]    ASSESSMENT AND PLAN:     63-year-old female being assessed for urethral caruncle, hypoestrogenic vulva, possible CADE, and constipation.    Diagnoses:  #1 Urethral caruncle  #2 Hypoestrogenic vulva  #3 Possible CADE  #4 Constipation    Plan:  Urethral caruncle  - Small, asymptomatic urethral prolapse noted. NO intervention required at this time as it is not causing significant symptoms.  - Patient education provided regarding the benign nature of the condition.  - Advised to monitor for any new symptoms such as irritation, bleeding, or increased urinary issues.    2. Hypoestrogenic vulva  - Discussed that utilizing tv estrogen acts as UTI prophylaxis in postmenopausal women by making the vaginal pH more acidic and providing more blood flow to vaginal tissue/changes the vasculature therefore preventing bacteria from colonizing. We reassured her that using tv estrogen cream has a localized effect to the vaginal tissue and is not a form of HRT such as po estrogen which produces a systemic effect. The risk of using vaginal estrogen cream in correlation with putting patients at greater risk for developing GYN cancer is extremely low as the estrogen acts locally and is not absorbed systemically.   - Counseled her to use the loading dose of E2 nightly for 2 weeks and then switch to the maintenance dose of using it 2x/week thereafter.   - Sent Rx Estradiol cream to her preferred pharmacy.   - Encouraged her to call our office and leave a urine sample at any  lab when she feels symptomatic of a UTI in the future so we can result it and treat her accordingly as overuse of antibiotics this puts the patient at risk for developing antibiotic-resistant infections. Will plan to only sent antibiotics for positive cultures and when she is symptomatic of a UTI.   - Discussed potential benefits of estrogen cream including improved tissue  health, reduced itching, and enhanced lubrication during intercourse.    3. Possible CADE  - Advised on pelvic floor exercises.  - No further intervention required at this time as it is not significantly bothersome to her.    4. Constipation  - Managed with MiraLAX. Continue current regimen.    Follow-up with Dr. Nuno for annual exam and refills of vaginal estrogen cream.    Scribe Attestation  By signing my name below, I Mandeep Emani, [place], attest that this documentation has been prepared under the direction and in the presence of Beth Bo MD MPH on 07/19/2024 at 5:58 PM.      Problem List Items Addressed This Visit    None  Visit Diagnoses       Urethral lesion    -  Primary    Relevant Orders    POCT UA Automated manually resulted (Completed)    Measure post void residual (Completed)    Urethral caruncle        Relevant Medications    estradiol (Estrace) 0.01 % (0.1 mg/gram) vaginal cream    Vaginal atrophy        Dyspareunia due to medical condition in female patient        CADE (stress urinary incontinence, female)               I spent a total of 45 minutes in face to face and non face to face time.     I Dr. Bo, personally performed the services described in the documentation as scribed in my presence and confirm it is both complete and accurate.  Beth Bo MD, MPH, FACOG       Beth Bo MD, MPH, FACOG     New    HISTORY OF PRESENT ILLNESS:     63-year-old female being assessed for urethral caruncle, hypoestrogenic vulva, possible CADE, and constipation.    Referred by: Dr. Vu Lang     Record Review:   - 5/10/2024 Dr. Vu Lang note RE: Red fleshy lesion at urethral meatus. No prolapse or urethral swelling. Referral to Urogynecology.    Urinary Symptoms:   - She leaks with cough some, but she does not find it annoying enough to do anything about it.  - She reports wearing pads occasionally for assurance, but she doesn't have accidents unless she stops to cough.  - She doesn't  get bladder infections or UTIs.  - She denies dribbling when standing up post-urination.    Bowel Symptoms:   - She reports occasional constipation, managed with MiraLAX.    OBGYN History and Sexual Activity:   - She did not know about the urethral lesion before he mentioned it.  - She has not had any bleeding.  - He said that he wants to have it looked at.  - She does not have any irritation, rubbing on underwear, or pain with urination.  -   - She doesn't remember any big tears, but she mentions having to pull her out with forceps due to a broken tailbone.   - She is sometimes sexually active.  - She reports occasional dyspareunia.  - She does not use a lubricant.  - Her last period was in .  - She's never had abdominal surgery.  - She mentions occasional itching.  - She denies vaginal dryness.    Family History:  - She is unaware of any family bladder cancer or kidney issues.      Past Medical History:     Past Medical History:   Diagnosis Date    Contusion of buttock 2023    Deficiency of other specified B group vitamins     B12 deficiency    Hemangioma of intra-abdominal structures     Hemangioma of liver    Lateral epicondylitis, unspecified elbow 2013    Lateral epicondylitis    Lip pain 2023    Personal history of diseases of the skin and subcutaneous tissue     History of rosacea    Personal history of other diseases of the digestive system 2021    History of gastroesophageal reflux (GERD)    Personal history of other diseases of urinary system     History of hematuria          Past Surgical History:     Past Surgical History:   Procedure Laterality Date    BI MAMMO GUIDED BREAST LEFT LOCALIZATION Left 2019    BI MAMMO GUIDED LOCALIZATION BREAST LEFT 2019 [facility]    DILATION AND CURETTAGE OF UTERUS  2014    Dilation And Curettage    KNEE SURGERY  2013    Knee Surgery         Medications:     Prior to Admission medications    Medication Sig Start  Date End Date Taking? Authorizing Provider   ascorbic acid (Vitamin C) 1,000 mg tablet Take 1 tablet (1,000 mg) by mouth once daily.   Yes Historical Provider, MD   aspirin 81 mg EC tablet Take 1 tablet (81 mg) by mouth once daily.   Yes Historical Provider, MD   b complex vitamins capsule Take 1 capsule by mouth once daily.   Yes Historical Provider, MD   calcium carbonate-vitamin D3 600 mg-5 mcg (200 unit) tablet Take 1 tablet by mouth once daily.   Yes Historical Provider, MD   cinnamon 500 mg capsule Take 1 capsule (500 mg) by mouth once daily.   Yes Historical Provider, MD   coconut oiL 1,000 mg capsule Take 1 capsule by mouth once daily.   Yes Historical Provider, MD   coenzyme Q-10 100 mg capsule Take 1 capsule (100 mg) by mouth once daily.   Yes Historical Provider, MD   cranberry extract 200 mg capsule Take 1 capsule by mouth once daily.   Yes Historical Provider, MD   cyanocobalamin (Vitamin B-12) 250 mcg tablet Take 1 tablet (250 mcg) by mouth once daily.   Yes Historical Provider, MD   famotidine (Pepcid) 10 mg tablet Take by mouth if needed.   Yes Historical Provider, MD   glucosamine-chondroitin 250-200 mg tablet Take by mouth.   Yes Historical Provider, MD   green tea leaf extract (Green Tea) capsule Take by mouth.   Yes Historical Provider, MD   lecithin, soy 1,200 mg capsule Take by mouth.   Yes Historical Provider, MD   lysine  mg capsule Take 1 capsule by mouth once daily.   Yes Historical Provider, MD   multivitamin tablet Take 1 tablet by mouth once daily.   Yes Historical Provider, MD   niacin 500 mg tablet Take 1 tablet (500 mg) by mouth once daily.   Yes Historical Provider, MD   omega-3 fatty acids-fish oil (One-Per-Day Omega-3) 684-1,200 mg capsule Take by mouth.   Yes Historical Provider, MD   pantoprazole (ProtoNix) 40 mg EC tablet Take 1 tablet (40 mg) by mouth once daily. 4/12/24 4/7/25 Yes Joseph Schwartz DO   pravastatin (Pravachol) 80 mg tablet Take 1 tablet (80 mg) by mouth  "once daily. 4/12/24 4/7/25 Yes Joseph Schwartz,    turmeric-turmeric root extract 450-50 mg capsule Take 1 capsule by mouth once daily.   Yes Historical Provider, MD   vit A,C and E-lutein-minerals (Vision Formula, with lutein,) 300 mcg-200 mg-27 mg-2 mg tablet Take 1 tablet by mouth once daily.   Yes Historical Provider, MD   vitamin A 2,400 mcg capsule Take 1 capsule (2.4 mg) by mouth once daily.   Yes Historical Provider, MD   estradiol (Estrace) 0.01 % (0.1 mg/gram) vaginal cream Insert 0.125 Applicatorfuls (0.5 g) into the vagina once daily. Nightly for 2 weeks, then twice weekly. 7/19/24 7/19/25  Beth Bo MD MPH        ROS  Review of Systems   Constitutional: Negative.    HENT: Negative.     Eyes: Negative.    Respiratory: Negative.     Cardiovascular: Negative.    Gastrointestinal:  Positive for constipation (occasional - managed with MiraLAX).   Endocrine: Negative.    Genitourinary:         Occasional urinary leakage with coughing, no pain with urination, no hematuria   Musculoskeletal: Negative.    Neurological: Negative.    Psychiatric/Behavioral: Negative.            PHYSICAL EXAM:    /66   Pulse 86   Ht 1.626 m (5' 4\")   Wt 64.9 kg (143 lb)   LMP  (LMP Unknown)   BMI 24.55 kg/m²   No LMP recorded (lmp unknown). Patient is postmenopausal.    Declines chaperone for physical exam.      Well developed, well nourished, in no apparent distress.   Neurologic/Psychiatric:  Awake, Alert and Oriented times 3.  Affect normal.     GENITAL/URINARY:     External Genitalia:  The patient has normal appearing external genitalia, normal skenes and bartholins glands, and a normal hair distribution.  Her vulva is without lesions, erythema or discharge.  It is non-tender with appropriate sensation.     Urethral Meatus:  Size normal, Location normal,    Urethra:  Fullness absent, Masses absent. There is a small caruncle/urethral prolapse roughly 2-3 mm in size     Bladder:  Fullness absent, Masses " "absent, Tenderness absent.    Vagina:  General appearance normal, Estrogen effect normal, Discharge absent, Lesions absent.     Cervix: Normal, no discharge.   Uterus:  normal size and mobile  Adnexa:   normal        Physical Exam  Genitourinary:      Genitourinary Comments: Hypoestrogenic vulva, a little loss of pigmentation around the inner labia majora bilaterally. No pain over the bladder. No masses over the urethra.    POP-Q measurements were:      Aa: -2, Ba: -2, C: -7     gH: 1, pB: 3, TVL:     Ap: -2, Bp: -2, D: -8            Data and DIAGNOSTIC STUDIES REVIEWED   No results found.   No results found for: \"URINECULTURE\", \"UAMICCOMM\"   Lab Results   Component Value Date    GLUCOSE 86 04/12/2024    CALCIUM 9.4 04/12/2024     04/12/2024    K 4.5 04/12/2024    CO2 29 04/12/2024     04/12/2024    BUN 12 04/12/2024    CREATININE 0.85 04/12/2024     Lab Results   Component Value Date    WBC 7.3 04/12/2024    HGB 12.0 04/12/2024    HCT 36.9 04/12/2024    MCV 90 04/12/2024     04/12/2024        "

## 2024-08-29 ENCOUNTER — TELEPHONE (OUTPATIENT)
Dept: OBSTETRICS AND GYNECOLOGY | Facility: CLINIC | Age: 64
End: 2024-08-29
Payer: COMMERCIAL

## 2024-08-29 NOTE — TELEPHONE ENCOUNTER
Patient is calling because she had a bone density and it is being billed as diagnostic. She was told if it was a routine order her insurance will pay for it. Please call to discuss.

## 2024-09-03 NOTE — TELEPHONE ENCOUNTER
called and is stating her bone density test is not being covered, due to using a diagnostic icd-10 code. The requisition show dx code Z00.00    I spoke with Sagrario at  billing and she states there is no claim on file for dos 4/12/24 Bone Density-

## 2025-01-10 ENCOUNTER — TELEPHONE (OUTPATIENT)
Dept: OBSTETRICS AND GYNECOLOGY | Facility: CLINIC | Age: 65
End: 2025-01-10
Payer: COMMERCIAL

## 2025-01-10 DIAGNOSIS — Z12.31 ENCOUNTER FOR SCREENING MAMMOGRAM FOR MALIGNANT NEOPLASM OF BREAST: ICD-10-CM

## 2025-01-10 NOTE — TELEPHONE ENCOUNTER
Ms Wright as an appointment on 4/11/25. She is requesting a mammogram order placed prior to her appointment

## 2025-01-10 NOTE — TELEPHONE ENCOUNTER
RN returned Patient call  Left voicemail  Last screening mammogram 4/12/2024  RN encouraged Patient to call insurance company and make sure the mammogram is covered if done before 04/12/2025  Erin Grant RN

## 2025-01-30 ENCOUNTER — TELEPHONE (OUTPATIENT)
Dept: OBSTETRICS AND GYNECOLOGY | Facility: CLINIC | Age: 65
End: 2025-01-30
Payer: COMMERCIAL

## 2025-01-30 DIAGNOSIS — Z12.31 ENCOUNTER FOR SCREENING MAMMOGRAM FOR MALIGNANT NEOPLASM OF BREAST: ICD-10-CM

## 2025-01-30 NOTE — TELEPHONE ENCOUNTER
Pt requesting an updated order for a mammogram she can have completed now.   Current mamm order from 1/10/2025 is scheduling pt out to 4/2025, pt requesting to have it completed sooner and per pt her insurance allows for one mamm per calendar year.   Pt's last mamm 4/12/2024 normal.   Order pended.

## 2025-03-29 ASSESSMENT — PROMIS GLOBAL HEALTH SCALE
RATE_MENTAL_HEALTH: VERY GOOD
CARRYOUT_SOCIAL_ACTIVITIES: VERY GOOD
RATE_PHYSICAL_HEALTH: VERY GOOD
RATE_SOCIAL_SATISFACTION: VERY GOOD
RATE_QUALITY_OF_LIFE: VERY GOOD
EMOTIONAL_PROBLEMS: RARELY
CARRYOUT_PHYSICAL_ACTIVITIES: COMPLETELY
RATE_AVERAGE_FATIGUE: MILD
RATE_AVERAGE_PAIN: 1
RATE_GENERAL_HEALTH: VERY GOOD

## 2025-04-04 ENCOUNTER — OFFICE VISIT (OUTPATIENT)
Dept: OBSTETRICS AND GYNECOLOGY | Facility: CLINIC | Age: 65
End: 2025-04-04
Payer: COMMERCIAL

## 2025-04-04 ENCOUNTER — APPOINTMENT (OUTPATIENT)
Dept: PRIMARY CARE | Facility: CLINIC | Age: 65
End: 2025-04-04
Payer: COMMERCIAL

## 2025-04-04 ENCOUNTER — HOSPITAL ENCOUNTER (OUTPATIENT)
Dept: RADIOLOGY | Facility: CLINIC | Age: 65
Discharge: HOME | End: 2025-04-04
Payer: COMMERCIAL

## 2025-04-04 VITALS
DIASTOLIC BLOOD PRESSURE: 70 MMHG | BODY MASS INDEX: 24.75 KG/M2 | WEIGHT: 145 LBS | HEIGHT: 64 IN | SYSTOLIC BLOOD PRESSURE: 110 MMHG

## 2025-04-04 VITALS — WEIGHT: 145 LBS | HEIGHT: 64 IN | BODY MASS INDEX: 24.75 KG/M2

## 2025-04-04 VITALS
HEART RATE: 86 BPM | OXYGEN SATURATION: 97 % | BODY MASS INDEX: 24.75 KG/M2 | SYSTOLIC BLOOD PRESSURE: 118 MMHG | DIASTOLIC BLOOD PRESSURE: 76 MMHG | TEMPERATURE: 97.3 F | RESPIRATION RATE: 16 BRPM | WEIGHT: 145 LBS | HEIGHT: 64 IN

## 2025-04-04 DIAGNOSIS — Z13.220 LIPID SCREENING: ICD-10-CM

## 2025-04-04 DIAGNOSIS — N39.3 STRESS INCONTINENCE: Primary | ICD-10-CM

## 2025-04-04 DIAGNOSIS — Z13.228 SCREENING FOR METABOLIC DISORDER: ICD-10-CM

## 2025-04-04 DIAGNOSIS — Z12.31 ENCOUNTER FOR SCREENING MAMMOGRAM FOR MALIGNANT NEOPLASM OF BREAST: ICD-10-CM

## 2025-04-04 DIAGNOSIS — E53.8 VITAMIN B12 DEFICIENCY: ICD-10-CM

## 2025-04-04 DIAGNOSIS — Z00.00 ROUTINE GENERAL MEDICAL EXAMINATION AT A HEALTH CARE FACILITY: Primary | ICD-10-CM

## 2025-04-04 DIAGNOSIS — E78.5 HYPERLIPIDEMIA, UNSPECIFIED HYPERLIPIDEMIA TYPE: ICD-10-CM

## 2025-04-04 DIAGNOSIS — E55.9 VITAMIN D DEFICIENCY: ICD-10-CM

## 2025-04-04 DIAGNOSIS — Z01.84 IMMUNITY STATUS TESTING: ICD-10-CM

## 2025-04-04 DIAGNOSIS — Z01.419 WELL WOMAN EXAM: ICD-10-CM

## 2025-04-04 PROBLEM — Z78.9 RUBELLA IMMUNE STATUS NOT KNOWN: Status: ACTIVE | Noted: 2025-04-04

## 2025-04-04 PROCEDURE — 3008F BODY MASS INDEX DOCD: CPT | Performed by: ADVANCED PRACTICE MIDWIFE

## 2025-04-04 PROCEDURE — 99396 PREV VISIT EST AGE 40-64: CPT | Performed by: FAMILY MEDICINE

## 2025-04-04 PROCEDURE — 99396 PREV VISIT EST AGE 40-64: CPT | Performed by: ADVANCED PRACTICE MIDWIFE

## 2025-04-04 PROCEDURE — 1036F TOBACCO NON-USER: CPT | Performed by: ADVANCED PRACTICE MIDWIFE

## 2025-04-04 PROCEDURE — 77063 BREAST TOMOSYNTHESIS BI: CPT

## 2025-04-04 RX ORDER — CETIRIZINE HYDROCHLORIDE 10 MG/1
10 TABLET ORAL DAILY
COMMUNITY

## 2025-04-04 RX ORDER — PRAVASTATIN SODIUM 80 MG/1
80 TABLET ORAL NIGHTLY
Qty: 90 TABLET | Refills: 1 | Status: SHIPPED | OUTPATIENT
Start: 2025-04-04 | End: 2025-10-01

## 2025-04-04 ASSESSMENT — PATIENT HEALTH QUESTIONNAIRE - PHQ9
SUM OF ALL RESPONSES TO PHQ9 QUESTIONS 1 AND 2: 0
1. LITTLE INTEREST OR PLEASURE IN DOING THINGS: NOT AT ALL
2. FEELING DOWN, DEPRESSED OR HOPELESS: NOT AT ALL

## 2025-04-04 ASSESSMENT — ENCOUNTER SYMPTOMS
OCCASIONAL FEELINGS OF UNSTEADINESS: 0
DEPRESSION: 0
LOSS OF SENSATION IN FEET: 0

## 2025-04-04 ASSESSMENT — PAIN SCALES - GENERAL: PAINLEVEL_OUTOF10: 0-NO PAIN

## 2025-04-04 NOTE — PROGRESS NOTES
"Subjective   Patient ID: Trudy Wright is a 64 y.o. female who presents for Annual Exam (Patient reported health Good//Regular Dental Visits yes//Regular Eye Visits yes//Hearing Loss no//Balanced Diet no//Weight Concerns yes//Exercise None///).    HPI  Patient comes in today for annual physical exam.  She has several questions today.  She states that she has been getting a lot of welts and does not see her dermatologist until next week and her daughter who is a nurse suggested that she try coming off of the pantoprazole and using famotidine instead.  She would like to try it and see if it helps.  She states she has also been taking Zyrtec and thinks it may be helping as well.    Patient is also concerned about her measles immunity.  Her daughter is pregnant and due in June and she would like the patient to have her measles vaccine updated.  I have recommended that she check her immunity status first.    Patient's PHQ-9 score today 0.    Review of Systems   All other systems reviewed and are negative.    Objective   Vitals:  /76   Pulse 86   Temp 36.3 °C (97.3 °F)   Resp 16   Ht 1.626 m (5' 4\")   Wt 65.8 kg (145 lb)   LMP  (LMP Unknown)   SpO2 97%   BMI 24.89 kg/m²     Physical Exam  Vitals reviewed.   Constitutional:       Appearance: She is well-developed.   HENT:      Head: Normocephalic and atraumatic.      Right Ear: Tympanic membrane, ear canal and external ear normal.      Left Ear: Tympanic membrane, ear canal and external ear normal.      Nose: Nose normal.      Mouth/Throat:      Mouth: Mucous membranes are moist.      Pharynx: Oropharynx is clear.   Eyes:      Extraocular Movements: Extraocular movements intact.      Conjunctiva/sclera: Conjunctivae normal.      Pupils: Pupils are equal, round, and reactive to light.   Cardiovascular:      Rate and Rhythm: Normal rate and regular rhythm.      Heart sounds: Normal heart sounds. No murmur heard.  Pulmonary:      Effort: Pulmonary effort is " "normal.      Breath sounds: Normal breath sounds. No wheezing.   Abdominal:      General: Abdomen is flat. Bowel sounds are normal.      Palpations: Abdomen is soft.   Musculoskeletal:         General: Normal range of motion.   Skin:     General: Skin is warm and dry.   Neurological:      General: No focal deficit present.      Mental Status: She is alert and oriented to person, place, and time. Mental status is at baseline.   Psychiatric:         Mood and Affect: Mood normal.         Behavior: Behavior normal.         Thought Content: Thought content normal.         Judgment: Judgment normal.       CBC -  Recent Labs     04/12/24 0919 04/28/23  0925 04/01/22  0949   WBC 7.3 7.9 8.4   HGB 12.0 12.3 12.5   HCT 36.9 37.9 39.8    448 454*   MCV 90 92 92       CMP -  Recent Labs     04/12/24 0919 04/28/23  0925 04/01/22  0949    140 139   K 4.5 4.6 3.9    103 101   CO2 29 30 31   ANIONGAP 11 12 11   BUN 12 11 10   CREATININE 0.85 0.80 0.87   EGFR 77  --   --      Recent Labs     04/12/24 0919 04/28/23  0925 04/01/22  0949   ALBUMIN 4.1 4.2 4.3   ALKPHOS 74 82 78   ALT 14 13 15   AST 14 15 16   BILITOT 0.5 0.5 0.4       LIPID PANEL -   Recent Labs     04/12/24 0919 04/28/23  0925 04/01/22  0949 04/09/21  0948   CHOL 173 173 174 178   LDLCALC 88  --   --   --    LDLF  --  85 89 100*   HDL 46.2 44.7 49.0 46.0   TRIG 193* 217* 182* 158*       No results for input(s): \"BNP\", \"HGBA1C\" in the last 10708 hours.    Lab Results   Component Value Date    NSQJVNTL49 469 04/12/2024       Lab Results   Component Value Date    VITD25 32 04/12/2024        Assessment/Plan   Problem List Items Addressed This Visit       Hyperlipidemia    Relevant Medications    pravastatin (Pravachol) 80 mg tablet    Screening for metabolic disorder    Relevant Orders    Comprehensive Metabolic Panel    Lipid screening    Relevant Orders    Lipid Panel    Vitamin D deficiency    Relevant Orders    Vitamin D 25-Hydroxy,Total (for " eval of Vitamin D levels)    Vitamin B12 deficiency    Relevant Orders    CBC    Vitamin B12    Immunity status testing    Relevant Orders    Rubeola Antibody, IgG     Other Visit Diagnoses       Routine general medical examination at a health care facility    -  Primary        Will contact patient with lab results when available.  Follow-up with Derm next week as planned  Medication list reconciled.  Thank you for visiting today!      Professional services: Some of this note was completed using Dragon voice technology and sometimes the software misinterprets words. This may include unintended errors with respect to translation of words, typographical errors or grammar errors which may not have been identified prior to finalization of the chart note. Please take this into account when reading the note. Thank you.       Joseph Schwartz DO 04/04/25 4:35 PM

## 2025-04-04 NOTE — PROGRESS NOTES
"Assessment/Plan   Problem List Items Addressed This Visit             ICD-10-CM       Medium    Well woman exam Z01.419     Discussed ASCCP guidelines of stopping paps after 65, no history of abnormal paps. Last pap 2024 NILM/HPV negative.   Discussed can continue annual mammograms  Reviewed urethral caruncle, never used estrogen cream, asymptomatic   RTO for APE or prn           Other Visit Diagnoses         Codes    Stress incontinence    -  Primary N39.3    Relevant Orders    Referral to Physical Therapy            Jaqueline Shelton, APRN-CNM     Subjective   Trudy Wright is a 64 y.o. female who is here for a routine exam.     Lives with: mother   Current employment: Dental assisant   T/E/D: Former tobacco/social ETOH/denies   Up to date vision/dental: yes   PCP: Dr. Schwartz  Menstrual history: menopause since 2013   Sexual history: monogamous male partner x 27 years   Rarely sexually active   Pregnancy history:  G/P  1/1  T: P:  EAB:   Ectopic:   SAB:   L:      Diet: poor diet    Exercise: on/off    PMH, PSH, and Family hx reviewed and updated    Current contraception: post menopausal status  Refill Y/N:    Last pap: 2024, NILM/HPV negative   History of abnormal Pap smear: no  Family history of breast, uterine,  ovarian cancer: no  Regular self breast exam: no  Last mammogram: pending from today   History of abnormal mammogram: yes - hx of benign breast biopsy           Review of Systems    Objective   /70   Ht 1.626 m (5' 4\")   Wt 65.8 kg (145 lb)   LMP  (LMP Unknown)   BMI 24.89 kg/m²     Physical Exam  Constitutional:       Appearance: Normal appearance.   HENT:      Head: Normocephalic.   Neck:      Thyroid: No thyroid mass, thyromegaly or thyroid tenderness.   Cardiovascular:      Rate and Rhythm: Normal rate and regular rhythm.   Pulmonary:      Effort: Pulmonary effort is normal.      Breath sounds: Normal breath sounds.   Chest:   Breasts:     Right: Normal. No mass, nipple discharge or " tenderness.      Left: Normal. No mass, nipple discharge or tenderness.   Abdominal:      Palpations: Abdomen is soft.   Genitourinary:     Urethra: Prolapse present.      Vagina: Normal.      Cervix: Normal.   Musculoskeletal:         General: Normal range of motion.   Lymphadenopathy:      Upper Body:      Right upper body: No axillary adenopathy.      Left upper body: No axillary adenopathy.   Skin:     General: Skin is warm and dry.   Neurological:      Mental Status: She is alert and oriented to person, place, and time.   Psychiatric:         Mood and Affect: Mood normal.

## 2025-04-04 NOTE — ASSESSMENT & PLAN NOTE
Discussed ASCCP guidelines of stopping paps after 65, no history of abnormal paps. Last pap 2024 NILM/HPV negative.   Discussed can continue annual mammograms  Reviewed urethral caruncle, never used estrogen cream, asymptomatic   RTO for APE or prn

## 2025-04-05 LAB
25(OH)D3+25(OH)D2 SERPL-MCNC: 64 NG/ML (ref 30–100)
ALBUMIN SERPL-MCNC: 4 G/DL (ref 3.6–5.1)
ALP SERPL-CCNC: 80 U/L (ref 37–153)
ALT SERPL-CCNC: 12 U/L (ref 6–29)
ANION GAP SERPL CALCULATED.4IONS-SCNC: 8 MMOL/L (CALC) (ref 7–17)
AST SERPL-CCNC: 14 U/L (ref 10–35)
BILIRUB SERPL-MCNC: 0.5 MG/DL (ref 0.2–1.2)
BUN SERPL-MCNC: 9 MG/DL (ref 7–25)
CALCIUM SERPL-MCNC: 9 MG/DL (ref 8.6–10.4)
CHLORIDE SERPL-SCNC: 104 MMOL/L (ref 98–110)
CHOLEST SERPL-MCNC: 170 MG/DL
CHOLEST/HDLC SERPL: 3.5 (CALC)
CO2 SERPL-SCNC: 28 MMOL/L (ref 20–32)
CREAT SERPL-MCNC: 0.82 MG/DL (ref 0.5–1.05)
EGFRCR SERPLBLD CKD-EPI 2021: 80 ML/MIN/1.73M2
ERYTHROCYTE [DISTWIDTH] IN BLOOD BY AUTOMATED COUNT: 12.1 % (ref 11–15)
GLUCOSE SERPL-MCNC: 85 MG/DL (ref 65–99)
HCT VFR BLD AUTO: 34.9 % (ref 35–45)
HDLC SERPL-MCNC: 48 MG/DL
HGB BLD-MCNC: 11.7 G/DL (ref 11.7–15.5)
LDLC SERPL CALC-MCNC: 101 MG/DL (CALC)
MCH RBC QN AUTO: 29.7 PG (ref 27–33)
MCHC RBC AUTO-ENTMCNC: 33.5 G/DL (ref 32–36)
MCV RBC AUTO: 88.6 FL (ref 80–100)
NONHDLC SERPL-MCNC: 122 MG/DL (CALC)
PLATELET # BLD AUTO: 473 THOUSAND/UL (ref 140–400)
PMV BLD REES-ECKER: 9.1 FL (ref 7.5–12.5)
POTASSIUM SERPL-SCNC: 4.5 MMOL/L (ref 3.5–5.3)
PROT SERPL-MCNC: 6.9 G/DL (ref 6.1–8.1)
RBC # BLD AUTO: 3.94 MILLION/UL (ref 3.8–5.1)
RUBV IGG SERPL IA-ACNC: NORMAL
SODIUM SERPL-SCNC: 140 MMOL/L (ref 135–146)
TRIGL SERPL-MCNC: 112 MG/DL
VIT B12 SERPL-MCNC: 601 PG/ML (ref 200–1100)
WBC # BLD AUTO: 10.6 THOUSAND/UL (ref 3.8–10.8)

## 2025-04-07 ENCOUNTER — TELEPHONE (OUTPATIENT)
Dept: PRIMARY CARE | Facility: CLINIC | Age: 65
End: 2025-04-07
Payer: COMMERCIAL

## 2025-04-07 DIAGNOSIS — J20.9 ACUTE BRONCHITIS, UNSPECIFIED ORGANISM: Primary | ICD-10-CM

## 2025-04-07 LAB
25(OH)D3+25(OH)D2 SERPL-MCNC: 64 NG/ML (ref 30–100)
ALBUMIN SERPL-MCNC: 4 G/DL (ref 3.6–5.1)
ALP SERPL-CCNC: 80 U/L (ref 37–153)
ALT SERPL-CCNC: 12 U/L (ref 6–29)
ANION GAP SERPL CALCULATED.4IONS-SCNC: 8 MMOL/L (CALC) (ref 7–17)
AST SERPL-CCNC: 14 U/L (ref 10–35)
BILIRUB SERPL-MCNC: 0.5 MG/DL (ref 0.2–1.2)
BUN SERPL-MCNC: 9 MG/DL (ref 7–25)
CALCIUM SERPL-MCNC: 9 MG/DL (ref 8.6–10.4)
CHLORIDE SERPL-SCNC: 104 MMOL/L (ref 98–110)
CHOLEST SERPL-MCNC: 170 MG/DL
CHOLEST/HDLC SERPL: 3.5 (CALC)
CO2 SERPL-SCNC: 28 MMOL/L (ref 20–32)
CREAT SERPL-MCNC: 0.82 MG/DL (ref 0.5–1.05)
EGFRCR SERPLBLD CKD-EPI 2021: 80 ML/MIN/1.73M2
ERYTHROCYTE [DISTWIDTH] IN BLOOD BY AUTOMATED COUNT: 12.1 % (ref 11–15)
GLUCOSE SERPL-MCNC: 85 MG/DL (ref 65–99)
HCT VFR BLD AUTO: 34.9 % (ref 35–45)
HDLC SERPL-MCNC: 48 MG/DL
HGB BLD-MCNC: 11.7 G/DL (ref 11.7–15.5)
LDLC SERPL CALC-MCNC: 101 MG/DL (CALC)
MCH RBC QN AUTO: 29.7 PG (ref 27–33)
MCHC RBC AUTO-ENTMCNC: 33.5 G/DL (ref 32–36)
MCV RBC AUTO: 88.6 FL (ref 80–100)
MEV IGG SER IA-ACNC: 71.6 AU/ML
NONHDLC SERPL-MCNC: 122 MG/DL (CALC)
PLATELET # BLD AUTO: 473 THOUSAND/UL (ref 140–400)
PMV BLD REES-ECKER: 9.1 FL (ref 7.5–12.5)
POTASSIUM SERPL-SCNC: 4.5 MMOL/L (ref 3.5–5.3)
PROT SERPL-MCNC: 6.9 G/DL (ref 6.1–8.1)
RBC # BLD AUTO: 3.94 MILLION/UL (ref 3.8–5.1)
SODIUM SERPL-SCNC: 140 MMOL/L (ref 135–146)
TRIGL SERPL-MCNC: 112 MG/DL
VIT B12 SERPL-MCNC: 601 PG/ML (ref 200–1100)
WBC # BLD AUTO: 10.6 THOUSAND/UL (ref 3.8–10.8)

## 2025-04-07 RX ORDER — AMOXICILLIN 875 MG/1
875 TABLET, FILM COATED ORAL 2 TIMES DAILY
Qty: 20 TABLET | Refills: 0 | Status: SHIPPED | OUTPATIENT
Start: 2025-04-07 | End: 2025-04-17

## 2025-04-07 NOTE — TELEPHONE ENCOUNTER
I spoke to patient this evening and prescription for amoxicillin sent to her [facility] pharmacy.

## 2025-04-07 NOTE — TELEPHONE ENCOUNTER
Pt states since her visit last Friday her cough has gotten worse and asks if there can be something sent in to her pharmacy. Please advise.

## 2025-04-10 ENCOUNTER — OFFICE VISIT (OUTPATIENT)
Dept: CARDIOLOGY | Facility: CLINIC | Age: 65
End: 2025-04-10
Payer: COMMERCIAL

## 2025-04-10 ENCOUNTER — APPOINTMENT (OUTPATIENT)
Dept: CARDIOLOGY | Facility: CLINIC | Age: 65
End: 2025-04-10
Payer: COMMERCIAL

## 2025-04-10 VITALS
SYSTOLIC BLOOD PRESSURE: 120 MMHG | WEIGHT: 147.2 LBS | DIASTOLIC BLOOD PRESSURE: 70 MMHG | BODY MASS INDEX: 25.13 KG/M2 | HEIGHT: 64 IN | HEART RATE: 81 BPM | OXYGEN SATURATION: 98 %

## 2025-04-10 DIAGNOSIS — E78.5 HYPERLIPIDEMIA, UNSPECIFIED HYPERLIPIDEMIA TYPE: Primary | ICD-10-CM

## 2025-04-10 PROCEDURE — 99213 OFFICE O/P EST LOW 20 MIN: CPT | Performed by: INTERNAL MEDICINE

## 2025-04-10 PROCEDURE — 3008F BODY MASS INDEX DOCD: CPT | Performed by: INTERNAL MEDICINE

## 2025-04-10 PROCEDURE — 93000 ELECTROCARDIOGRAM COMPLETE: CPT | Performed by: INTERNAL MEDICINE

## 2025-04-10 RX ORDER — ATORVASTATIN CALCIUM 20 MG/1
20 TABLET, FILM COATED ORAL DAILY
Qty: 30 TABLET | Refills: 11 | Status: SHIPPED | OUTPATIENT
Start: 2025-04-10 | End: 2026-04-10

## 2025-04-10 NOTE — PROGRESS NOTES
"[facility]/[place]     Cardiology Office Follow-up: Annual Follow-up    Patient previously seen for dyslipidemia, chest pain presumed GERD related, abnormal ECG (nonspecific ST & T abnormality diffusely), and mild catheter-induced coronary spasm noted by catheterization in 2008 .  Also with hemangioma of the liver, osteochondritis.    At the previous encounter, the patient was seen by Adrianne, no testing ordered.    After the encounter the patient completed the following testing: none    There were no interval changes in medical history before this appointment.    Today the patient reports: ***    Additional recent non-cardiac testing includes: none    EKG today is normal sinus, NSTTW unchanged from prior.    ROS: Remainder of 12 review of systems is negative aside from chief complaint.    PHYSICAL EXAM  Vitals:    04/10/25 0911   BP: 120/70   BP Location: Left arm   Patient Position: Sitting   Pulse: 81   SpO2: 98%   Weight: 66.8 kg (147 lb 3.2 oz)   Height: 1.626 m (5' 4\")     General: No acute distress, appears comfortable  Cardiac: Regular rate and rhythm with no murmurs rubs or gallops, normal S1-S2  Pulmonary: Clear to auscultation bilaterally with no rales or rhonchi, normal respiratory effort  Gastrointestinal: Soft abdomen with no tenderness or guarding, no rebound  Musculoskeletal: No lower extremity edema, normal  Neurological: Alert and oriented x 4, appropriate, moving all extremities well, normal affect  Psychiatric: Normal affect, mood appropriate to occasion  Skin: No rashes, hives or jaundice  HEENT: Normocephalic, atraumatic.  Pupils equal round and reactive.    Assessment/Plan   {Assess/Plan SmartLinks:51188}    Assessment and plan (narrative):    Trudy Wright is a 64 y.o. female ***    CV plan by problem:     CAD: Patient has *** angina, class ***.  Current GDMT includes ***, which I will continue.  *** medications added/increased this visit.  Trend of condition is ***.    HPL: " Patient has *** hyperlipidemia.  Patient is *** statin intolerant.  Current GDMT includes ***, which I will continue.  Trend of condition is ***.    HTN: Patient has *** HTN, *** controlled.  Current GDMT includes ***, which I will continue.  Goal BP is ***. *** medications added/increased this visit.  Trend of condition is ***.    CHF: Patient has ***  CHF, class ***.  Current GDMT includes *** which I will continue.  BP/HR/edema is *** controlled.  *** medications added/increased this visit.  Trend of condition is ***.    Afib: Patient has *** atrial fibrillation.  Current GDMT includes ***, which I will continue.  *** medications added/increased this visit.  Trend of condition is ***    Discussed switching to atorvastatin    Followup: ***    Goran Cary MD    Director of Interventional Cardiology  [facility] [place] at [facility] [place]

## 2025-04-11 ENCOUNTER — APPOINTMENT (OUTPATIENT)
Dept: OBSTETRICS AND GYNECOLOGY | Facility: CLINIC | Age: 65
End: 2025-04-11
Payer: COMMERCIAL

## 2025-04-16 PROBLEM — R79.89 HIGH SERUM LOW-DENSITY LIPOPROTEIN (LDL): Status: ACTIVE | Noted: 2025-04-16

## 2025-04-16 PROBLEM — E78.2 HYPERLIPIDEMIA, MIXED: Status: ACTIVE | Noted: 2023-03-03

## 2025-04-16 PROBLEM — R74.8 LOW SERUM HDL: Status: ACTIVE | Noted: 2025-04-16

## 2025-04-18 ENCOUNTER — APPOINTMENT (OUTPATIENT)
Dept: OBSTETRICS AND GYNECOLOGY | Facility: CLINIC | Age: 65
End: 2025-04-18
Payer: COMMERCIAL

## 2025-04-24 ENCOUNTER — APPOINTMENT (OUTPATIENT)
Dept: CARDIOLOGY | Facility: CLINIC | Age: 65
End: 2025-04-24
Payer: COMMERCIAL

## 2025-07-15 DIAGNOSIS — K21.9 GASTROESOPHAGEAL REFLUX DISEASE WITHOUT ESOPHAGITIS: ICD-10-CM

## 2025-07-16 RX ORDER — PANTOPRAZOLE SODIUM 40 MG/1
40 TABLET, DELAYED RELEASE ORAL DAILY
Qty: 90 TABLET | Refills: 3 | OUTPATIENT
Start: 2025-07-16

## 2025-08-12 DIAGNOSIS — E78.2 HYPERLIPIDEMIA, MIXED: ICD-10-CM

## 2025-08-13 RX ORDER — PRAVASTATIN SODIUM 80 MG/1
80 TABLET ORAL DAILY
Qty: 90 TABLET | Refills: 0 | OUTPATIENT
Start: 2025-08-13 | End: 2025-11-11

## 2025-08-14 DIAGNOSIS — K21.9 GASTROESOPHAGEAL REFLUX DISEASE WITHOUT ESOPHAGITIS: ICD-10-CM

## 2025-08-14 DIAGNOSIS — E78.2 HYPERLIPIDEMIA, MIXED: ICD-10-CM

## 2025-08-15 RX ORDER — PANTOPRAZOLE SODIUM 40 MG/1
40 TABLET, DELAYED RELEASE ORAL DAILY
Qty: 90 TABLET | Refills: 0 | Status: SHIPPED | OUTPATIENT
Start: 2025-08-15 | End: 2026-02-11

## 2025-08-15 RX ORDER — PRAVASTATIN SODIUM 80 MG/1
80 TABLET ORAL NIGHTLY
Qty: 90 TABLET | Refills: 0 | Status: SHIPPED | OUTPATIENT
Start: 2025-08-15 | End: 2026-02-11

## 2025-10-10 ENCOUNTER — APPOINTMENT (OUTPATIENT)
Dept: PRIMARY CARE | Facility: CLINIC | Age: 65
End: 2025-10-10
Payer: COMMERCIAL

## 2026-04-10 ENCOUNTER — APPOINTMENT (OUTPATIENT)
Dept: RADIOLOGY | Facility: CLINIC | Age: 66
End: 2026-04-10
Payer: COMMERCIAL

## 2026-04-10 ENCOUNTER — APPOINTMENT (OUTPATIENT)
Dept: OBSTETRICS AND GYNECOLOGY | Facility: CLINIC | Age: 66
End: 2026-04-10
Payer: COMMERCIAL

## 2026-04-10 DIAGNOSIS — Z12.31 SCREENING MAMMOGRAM FOR BREAST CANCER: ICD-10-CM

## 2026-04-16 ENCOUNTER — APPOINTMENT (OUTPATIENT)
Dept: CARDIOLOGY | Facility: CLINIC | Age: 66
End: 2026-04-16
Payer: COMMERCIAL